# Patient Record
Sex: MALE | URBAN - METROPOLITAN AREA
[De-identification: names, ages, dates, MRNs, and addresses within clinical notes are randomized per-mention and may not be internally consistent; named-entity substitution may affect disease eponyms.]

---

## 2018-07-13 ENCOUNTER — IMPORTED ENCOUNTER (OUTPATIENT)
Dept: URBAN - METROPOLITAN AREA CLINIC 31 | Facility: CLINIC | Age: 73
End: 2018-07-13

## 2018-07-13 PROBLEM — H02.834: Noted: 2018-07-13

## 2018-07-13 PROBLEM — Z96.1: Noted: 2018-07-13

## 2018-07-13 PROBLEM — E11.9: Noted: 2018-07-13

## 2018-07-13 PROBLEM — H02.831: Noted: 2018-07-13

## 2018-07-13 PROBLEM — H02.403: Noted: 2018-07-13

## 2018-07-13 PROCEDURE — 92015 DETERMINE REFRACTIVE STATE: CPT

## 2018-07-13 PROCEDURE — 92004 COMPRE OPH EXAM NEW PT 1/>: CPT

## 2018-07-13 NOTE — PATIENT DISCUSSION
1.  1.  Pseudophakia OU - IOLs stable. Monitor. 2.  DM II without sign of Diabetic Retinopathy OU:  Discussed the pathophysiology of diabetes and its effect on the eye. Stressed the importance of regular followup and good control of BS BP and Lipids to avoid future complications. 3. Dermatochalasis OU:  Patient currently symptomatic. Observe for now. 4. upper eyelid Ptosis OU: consider surgery - discussed with the patient. Visually significant. can come for VF taped and untaped and to schedule surgery  if desires5. Brow ptosis - would be considered mostly cosmetic if upper eyelid ptosis is repaired. Return for an appointment in 12 months for comprehensive exam. with Dr. Bianca Reyes.

## 2018-09-06 ENCOUNTER — IMPORTED ENCOUNTER (OUTPATIENT)
Dept: URBAN - METROPOLITAN AREA CLINIC 31 | Facility: CLINIC | Age: 73
End: 2018-09-06

## 2018-09-06 PROBLEM — H02.403: Noted: 2018-09-06

## 2018-09-06 PROCEDURE — 99213 OFFICE O/P EST LOW 20 MIN: CPT

## 2018-09-06 PROCEDURE — 92285 EXTERNAL OCULAR PHOTOGRAPHY: CPT

## 2018-09-06 PROCEDURE — 92082 INTERMEDIATE VISUAL FIELD XM: CPT

## 2018-10-05 ENCOUNTER — IMPORTED ENCOUNTER (OUTPATIENT)
Dept: URBAN - METROPOLITAN AREA CLINIC 31 | Facility: CLINIC | Age: 73
End: 2018-10-05

## 2018-10-05 PROBLEM — Z98.89: Noted: 2018-10-05

## 2018-10-05 PROCEDURE — 99024 POSTOP FOLLOW-UP VISIT: CPT

## 2018-10-05 NOTE — PATIENT DISCUSSION
Post Operative:  Doing well post bilateral upper eyelid ptosis repair with internal browpexy. Call with any problems.

## 2018-12-19 NOTE — PATIENT DISCUSSION
Ptosis OU:  RECOMMENDED BILATERAL PTOSIS REPAIR ( WITH SKIN EXCISION) AND OPTIONAL ( COSMETIC) INTERNAL BROWPEXY. within normal limits

## 2022-04-02 ASSESSMENT — VISUAL ACUITY
OD_PH: SC 20/25
OU_SC: 20/20
OS_CC: 20/40+2
OS_SC: 20/20-1
OD_SC: 20/20
OD_CC: 20/40-2
OS_PH: SC 20/25

## 2022-04-02 ASSESSMENT — TONOMETRY
OS_IOP_MMHG: 14
OD_IOP_MMHG: 13